# Patient Record
Sex: FEMALE | Race: WHITE | NOT HISPANIC OR LATINO | Employment: FULL TIME | URBAN - METROPOLITAN AREA
[De-identification: names, ages, dates, MRNs, and addresses within clinical notes are randomized per-mention and may not be internally consistent; named-entity substitution may affect disease eponyms.]

---

## 2018-01-16 ENCOUNTER — APPOINTMENT (OUTPATIENT)
Dept: LAB | Facility: CLINIC | Age: 28
End: 2018-01-16
Payer: COMMERCIAL

## 2018-01-16 ENCOUNTER — TRANSCRIBE ORDERS (OUTPATIENT)
Dept: LAB | Facility: CLINIC | Age: 28
End: 2018-01-16

## 2018-01-16 ENCOUNTER — ALLSCRIPTS OFFICE VISIT (OUTPATIENT)
Dept: OTHER | Facility: OTHER | Age: 28
End: 2018-01-16

## 2018-01-16 ENCOUNTER — GENERIC CONVERSION - ENCOUNTER (OUTPATIENT)
Dept: OTHER | Facility: OTHER | Age: 28
End: 2018-01-16

## 2018-01-16 DIAGNOSIS — E87.6 HYPOKALEMIA: ICD-10-CM

## 2018-01-16 DIAGNOSIS — R53.83 OTHER FATIGUE: ICD-10-CM

## 2018-01-16 LAB
ALBUMIN SERPL BCP-MCNC: 4.1 G/DL (ref 3.5–5)
ALP SERPL-CCNC: 74 U/L (ref 46–116)
ALT SERPL W P-5'-P-CCNC: 25 U/L (ref 12–78)
ANION GAP SERPL CALCULATED.3IONS-SCNC: 5 MMOL/L (ref 4–13)
AST SERPL W P-5'-P-CCNC: 14 U/L (ref 5–45)
BASOPHILS # BLD AUTO: 0.01 THOUSANDS/ΜL (ref 0–0.1)
BASOPHILS NFR BLD AUTO: 0 % (ref 0–1)
BILIRUB SERPL-MCNC: 0.35 MG/DL (ref 0.2–1)
BUN SERPL-MCNC: 16 MG/DL (ref 5–25)
CALCIUM SERPL-MCNC: 9.1 MG/DL (ref 8.3–10.1)
CHLORIDE SERPL-SCNC: 104 MMOL/L (ref 100–108)
CHOLEST SERPL-MCNC: 226 MG/DL (ref 50–200)
CO2 SERPL-SCNC: 28 MMOL/L (ref 21–32)
CREAT SERPL-MCNC: 0.82 MG/DL (ref 0.6–1.3)
EOSINOPHIL # BLD AUTO: 0.1 THOUSAND/ΜL (ref 0–0.61)
EOSINOPHIL NFR BLD AUTO: 1 % (ref 0–6)
ERYTHROCYTE [DISTWIDTH] IN BLOOD BY AUTOMATED COUNT: 12.5 % (ref 11.6–15.1)
GFR SERPL CREATININE-BSD FRML MDRD: 100 ML/MIN/1.73SQ M
GLUCOSE P FAST SERPL-MCNC: 78 MG/DL (ref 65–99)
HCT VFR BLD AUTO: 42.7 % (ref 34.8–46.1)
HDLC SERPL-MCNC: 65 MG/DL (ref 40–60)
HGB BLD-MCNC: 14.4 G/DL (ref 11.5–15.4)
LDLC SERPL CALC-MCNC: 153 MG/DL (ref 0–100)
LYMPHOCYTES # BLD AUTO: 3.03 THOUSANDS/ΜL (ref 0.6–4.47)
LYMPHOCYTES NFR BLD AUTO: 37 % (ref 14–44)
MCH RBC QN AUTO: 29.4 PG (ref 26.8–34.3)
MCHC RBC AUTO-ENTMCNC: 33.7 G/DL (ref 31.4–37.4)
MCV RBC AUTO: 87 FL (ref 82–98)
MONOCYTES # BLD AUTO: 0.65 THOUSAND/ΜL (ref 0.17–1.22)
MONOCYTES NFR BLD AUTO: 8 % (ref 4–12)
NEUTROPHILS # BLD AUTO: 4.42 THOUSANDS/ΜL (ref 1.85–7.62)
NEUTS SEG NFR BLD AUTO: 54 % (ref 43–75)
NRBC BLD AUTO-RTO: 0 /100 WBCS
PLATELET # BLD AUTO: 335 THOUSANDS/UL (ref 149–390)
PMV BLD AUTO: 11.5 FL (ref 8.9–12.7)
POTASSIUM SERPL-SCNC: 3.4 MMOL/L (ref 3.5–5.3)
PROT SERPL-MCNC: 8.2 G/DL (ref 6.4–8.2)
RBC # BLD AUTO: 4.89 MILLION/UL (ref 3.81–5.12)
SODIUM SERPL-SCNC: 137 MMOL/L (ref 136–145)
TRIGL SERPL-MCNC: 41 MG/DL
TSH SERPL DL<=0.05 MIU/L-ACNC: 2.47 UIU/ML (ref 0.36–3.74)
WBC # BLD AUTO: 8.22 THOUSAND/UL (ref 4.31–10.16)

## 2018-01-16 PROCEDURE — 80053 COMPREHEN METABOLIC PANEL: CPT

## 2018-01-16 PROCEDURE — 85025 COMPLETE CBC W/AUTO DIFF WBC: CPT

## 2018-01-16 PROCEDURE — 36415 COLL VENOUS BLD VENIPUNCTURE: CPT

## 2018-01-16 PROCEDURE — 80061 LIPID PANEL: CPT

## 2018-01-16 PROCEDURE — 84443 ASSAY THYROID STIM HORMONE: CPT

## 2018-01-17 NOTE — PROGRESS NOTES
Assessment   1  Fatigue (780 79) (R53 83)   2  Epistaxis (784 7) (R04 0)   3  Pressure in head (784 0) (R51)   4  Impacted cerumen of left ear (380 4) (H61 22)   5  Elevated blood pressure reading (796 2) (R03 0)   6  Never a smoker    Plan   Fatigue    · (1) CBC/PLT/DIFF; Status:Active; Requested [id number]:[id number];    · (1) COMPREHENSIVE METABOLIC PANEL; Status:Active; Requested [id number]:[id number];    · (1) LIPID PANEL FASTING W DIRECT LDL REFLEX; Status:Active; Requested    BHE:[id number];    · (1) TSH WITH FT4 REFLEX; Status:Active; Requested RAC:[id number]; Impacted cerumen of left ear    · Removal Impacted Cerumen Requiring Instrumentation one or both ears - POC;    Status:Complete;   Done: 24COH5002    Discussion/Summary      1  Fatigue -possibly related to her night shift work schedule - the patient was sent for labs above to assess for other potential causes  Pressure in the head -we reviewed that this appears to be most likely residual pressure from the significant spasm that she had in the trapezius about 2 weeks ago  She was encouraged to do some heating pad treatments to her neck area as well as stretches of the neck throughout the day as well as following any heating pad use  She was encouraged to call for symptoms worsen or fail to improve  Elevated blood pressure -we reviewed that her blood pressure is borderline elevated today  She states that she just had a work physical a few days ago and was told that it was normal  We will reassess when she returns for her next visit  Epistaxis -I reassured the patient that it sounds as though she just had an episode of epistaxis that was likely more on the posterior nasopharynx rather than the anterior nasopharynx  We reviewed that on exam I do not see any active bleeding  She was encouraged to try a saline nasal spray to help add some moisture to her nose since the air is drier during this time of year with the use of heat      Cerumen impaction of the left ear -removed successfully with a plastic loop  Possible side effects of new medications were reviewed with the patient/guardian today  The treatment plan was reviewed with the patient/guardian  The patient/guardian understands and agrees with the treatment plan      Chief Complaint   c/o one episode nose bleeding and head pressure for the past 2 weeks  History of Present Illness   HPI: The patient presents today for a few concerns  She notes that she was at work last night and tasted blood in her mouth  She was able to spit it out but later had a small amount the dripped from her nose  She showered when she got home and had more that she could spit it out  She has not had anymore since then  She does not have cold symptoms but remembers her nose feeling open like air blowing through it excessively when she breathed  She notes that this was in the right nare which is also the side that was bleeding  patient also notes that for 2 weeks she has pressure in the right back of the head - 2 weeks ago woke up with severe neck spasm - sleeps with pile of pillows so the number she actually sleeps on varies from night to night - denies any photophobia/phonophobia/tingling/numbness   also notes that she has been tired a lot - 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 but thinks she has always been tired -notes that she does sleep most of the day though prior to going to work (usually for about 7 hours, is awake for a few hours, then gets another 3 hours of sleep before work)      Review of Systems        Constitutional: no fever-- and-- no chills  ENT: nosebleeds, but-- as noted in HPI,-- no sore throat-- and-- no nasal discharge  Respiratory: no shortness of breath,-- no cough-- and-- no wheezing  Gastrointestinal: no nausea-- and-- no vomiting  Neurological: headache, but-- as noted in HPI,-- no numbness,-- no tingling-- and-- no dizziness  Active Problems   1  Nephrolithiasis (592 0) (N20 0)    Past Medical History   1  History of acute pharyngitis (V12 69) (Z87 09)   2  History of dermatitis (V13 3) (Z87 2)   3  History of upper respiratory infection (V12 09) (Z87 09)  Active Problems And Past Medical History Reviewed: The active problems and past medical history were reviewed and updated today  Family History   Maternal Grandmother    1  Family history of colon cancer (V16 0) (Z80 0)   2  Family history of hypertension (V17 49) (Z82 49)  Paternal Grandmother    3  Family history of Alzheimer's disease (V17 2) (Z82 0)  Maternal Grandfather    4  Family history of hypertension (V17 49) (Z82 49)    Social History    · Caffeine use (V49 89) (F15 90)   · Never a smoker   · No illicit drug use   · Occasional alcohol use    Current Meds    1  Junel Fe 24 1-20 MG-MCG(24) Oral Tablet; Therapy: (Recorded:53Vue1062) to Recorded     The medication list was reviewed and updated today  Allergies   1  No Known Drug Allergies    Vitals    Recorded: 02YRH4213 09:15AM Recorded: 36XBX0139 08:52AM   Temperature  98 8 F   Heart Rate  88   Systolic 819 131   Diastolic 90 90   Height  5 ft 4 5 in   Weight  163 lb    BMI Calculated  27 55   BSA Calculated  1 8     Physical Exam        Constitutional      General appearance: No acute distress, well appearing and well nourished  Head and Face      Head and face: Normal        Eyes      Conjunctiva and lids: No swelling, erythema or discharge  Pupils and irises: Equal, round, reactive to light  Ears, Nose, Mouth, and Throat      External inspection of ears and nose: Normal        Otoscopic examination: Abnormal  -- cerumen impaction n the left ear  Hearing: Normal        Nasal mucosa, septum, and turbinates: Normal without edema or erythema  -- no erythema, swelling, bleeding noted  Lips, teeth, and gums: Normal, good dentition         Oropharynx: Normal with no erythema, edema, exudate or lesions  Neck      Neck: Supple, symmetric, trachea midline, no masses  Thyroid: Normal, no thyromegaly  Pulmonary      Respiratory effort: No increased work of breathing or signs of respiratory distress  Auscultation of lungs: Clear to auscultation  Cardiovascular      Auscultation of heart: Normal rate and rhythm, normal S1 and S2, no murmurs  Peripheral vascular exam: Normal   Radial: right 2+-- and-- left 2+  Lymphatic      Palpation of lymph nodes in neck: Abnormal  -- mild enlargement in the tonsillar region  Musculoskeletal      Gait and station: Normal        Muscle strength/tone: Normal   Motor Strength Findings: normal upper extremity strength-- and-- normal lower extremity strength  Neurologic      Sensation: No sensory loss  Sensory exam: intact to light touch  Psychiatric      Mood and affect: Normal        Results/Data   PHQ-2 Adult Depression Screening 45UPK7182 08:54AM User, MCH+s      Test Name Result Flag Reference   PHQ-2 Adult Depression Score 0     Over the last two weeks, how often have you been bothered by any of the following problems? Little interest or pleasure in doing things: Not at all - 0     Feeling down, depressed, or hopeless: Not at all - 0   PHQ-2 Adult Depression Screening Negative          Procedure                    Procedure: cerumen removal       Indication: cerumen impaction in the left ear  Procedure Note: The procedure was performed by the Provider  A otoscope was placed in the ear canal(s) to visualize the ear canal debris  The ear was cleaned by using plastic loop  The procedure was successful  Post-Procedure:      Patient Status: the patient tolerated the procedure well  Complications: there were no complications  Follow-up as needed                 Signatures    Electronically signed by : Vj Barba [facility]; Jan 16 2018 12:43PM EST                       (Author) Electronically signed by : ZOE Amador ; Jan 16 2018  7:14PM EST

## 2018-01-23 VITALS
BODY MASS INDEX: 27.16 KG/M2 | TEMPERATURE: 98.8 F | WEIGHT: 163 LBS | SYSTOLIC BLOOD PRESSURE: 140 MMHG | HEIGHT: 65 IN | DIASTOLIC BLOOD PRESSURE: 90 MMHG | HEART RATE: 88 BPM

## 2018-01-23 NOTE — RESULT NOTES
Verified Results  (1) CBC/PLT/DIFF [id number] 09:43AM Deirdre Taylor Order Number: [id number]     Test Name Result Flag Reference   WBC COUNT 8 22 Thousand/uL  4 31-10 16   RBC COUNT 4 89 Million/uL  3 81-5 12   HEMOGLOBIN 14 4 g/dL  11 5-15 4   HEMATOCRIT 42 7 %  34 8-46  1   MCV 87 fL  82-98   MCH 29 4 pg  26 8-34 3   MCHC 33 7 g/dL  31 4-37 4   RDW 12 5 %  11 6-15 1   MPV 11 5 fL  8 9-12 7   PLATELET COUNT 232 Thousands/uL  149-390   nRBC AUTOMATED 0 /100 WBCs     NEUTROPHILS RELATIVE PERCENT 54 %  43-75   LYMPHOCYTES RELATIVE PERCENT 37 %  14-44   MONOCYTES RELATIVE PERCENT 8 %  4-12   EOSINOPHILS RELATIVE PERCENT 1 %  0-6   BASOPHILS RELATIVE PERCENT 0 %  0-1   NEUTROPHILS ABSOLUTE COUNT 4 42 Thousands/? ??L  1 85-7 62   LYMPHOCYTES ABSOLUTE COUNT 3 03 Thousands/? ??L  0 60-4 47   MONOCYTES ABSOLUTE COUNT 0 65 Thousand/? ??L  0 17-1 22   EOSINOPHILS ABSOLUTE COUNT 0 10 Thousand/? ??L  0 00-0 61   BASOPHILS ABSOLUTE COUNT 0 01 Thousands/? ??L  0 00-0 10     (1) COMPREHENSIVE METABOLIC PANEL [id number] 93:79LD Ken Bustamante    Order Number: [id number]     Test Name Result Flag Reference   SODIUM 137 mmol/L  136-145   POTASSIUM 3 4 mmol/L L 3 5-5 3   CHLORIDE 104 mmol/L  100-108   CARBON DIOXIDE 28 mmol/L  21-32   ANION GAP (CALC) 5 mmol/L  4-13   BLOOD UREA NITROGEN 16 mg/dL  5-25   CREATININE 0 82 mg/dL  0 60-1 30   Standardized to IDMS reference method   CALCIUM 9 1 mg/dL  8 3-10 1   BILI, TOTAL 0 35 mg/dL  0 20-1 00   ALK PHOSPHATAS 74 U/L     ALT (SGPT) 25 U/L  12-78   Specimen collection should occur prior to Sulfasalazine and/or Sulfapyridine administration due to the potential for falsely depressed results  AST(SGOT) 14 U/L  5-45   Specimen collection should occur prior to Sulfasalazine administration due to the potential for falsely depressed results     ALBUMIN 4 1 g/dL  3 5-5 0   TOTAL PROTEIN 8 2 g/dL  6 4-8 2   eGFR 100 ml/min/1 73sq m     National Kidney Disease Education Program recommendations are as follows:  GFR calculation is accurate only with a steady state creatinine  Chronic Kidney disease less than 60 ml/min/1 73 sq  meters  Kidney failure less than 15 ml/min/1 73 sq  meters  GLUCOSE FASTING 78 mg/dL  65-99   Specimen collection should occur prior to Sulfasalazine administration due to the potential for falsely depressed results  Specimen collection should occur prior to Sulfapyridine administration due to the potential for falsely elevated results  (1) LIPID PANEL FASTING W DIRECT LDL REFLEX [id number] 09:43AM Islamorada Cera   TW Order Number: [id number]     Test Name Result Flag Reference   CHOLESTEROL 226 mg/dL H    Cholesterol:    Desirable <200 mg/dl    Borderline 200-239 mg/dl    High>239   LDL CHOLESTEROL CALCULATED 153 mg/dL H 0-100   This screening LDL is a calculated result  It does not have the accuracy of the Direct Measured LDL in the monitoring of patients with hyperlipidemia and/or statin therapy  Direct Measure LDL (RHZ375) must be ordered separately in these patients  Triglyceride:        Normal <150 mg/dl   Borderline High 150-199 mg/dl   High 200-499 mg/dl   Very High >499 mg/dl   TRIGLYCERIDES 41 mg/dL  <=150   Specimen collection should occur prior to N-Acetylcysteine or Metamizole administration due to the potential for falsely depressed results  HDL,DIRECT 65 mg/dL H 40-60   HDL Cholesterol:    High>59 mg/dL    Low <41 mg/dL  HDL Cholesterol:    High>59 mg/dL    Low <41 mg/dL     (1) TSH WITH FT4 REFLEX [id number] 09:43AM [place]orada Cera   TW Order Number: [id number]     Test Name Result Flag Reference   TSH 2 470 uIU/mL  0 358-3 740   Patients undergoing fluorescein dye angiography may retain small amounts of fluorescein in the body for 48-72 hours post procedure  Samples containing fluorescein can produce falsely depressed TSH values   If the patient had this procedure,a specimen should be resubmitted post fluorescein clearance            The recommended reference ranges for TSH during pregnancy are as follows:  First trimester 0 1 to 2 5 uIU/mL  Second trimester  0 2 to 3 0 uIU/mL  Third trimester 0 3 to 3 0 uIU/m

## 2018-01-31 ENCOUNTER — APPOINTMENT (OUTPATIENT)
Dept: LAB | Facility: MEDICAL CENTER | Age: 28
End: 2018-01-31
Payer: COMMERCIAL

## 2018-01-31 ENCOUNTER — TRANSCRIBE ORDERS (OUTPATIENT)
Dept: ADMINISTRATIVE | Facility: HOSPITAL | Age: 28
End: 2018-01-31

## 2018-01-31 DIAGNOSIS — E87.6 HYPOKALEMIA: ICD-10-CM

## 2018-01-31 LAB
ANION GAP SERPL CALCULATED.3IONS-SCNC: 5 MMOL/L (ref 4–13)
BUN SERPL-MCNC: 13 MG/DL (ref 5–25)
CALCIUM SERPL-MCNC: 9.1 MG/DL (ref 8.3–10.1)
CHLORIDE SERPL-SCNC: 104 MMOL/L (ref 100–108)
CO2 SERPL-SCNC: 29 MMOL/L (ref 21–32)
CREAT SERPL-MCNC: 0.8 MG/DL (ref 0.6–1.3)
GFR SERPL CREATININE-BSD FRML MDRD: 103 ML/MIN/1.73SQ M
GLUCOSE SERPL-MCNC: 84 MG/DL (ref 65–140)
POTASSIUM SERPL-SCNC: 4.1 MMOL/L (ref 3.5–5.3)
SODIUM SERPL-SCNC: 138 MMOL/L (ref 136–145)

## 2018-01-31 PROCEDURE — 36415 COLL VENOUS BLD VENIPUNCTURE: CPT

## 2018-01-31 PROCEDURE — 80048 BASIC METABOLIC PNL TOTAL CA: CPT

## 2018-07-17 DIAGNOSIS — E78.00 PURE HYPERCHOLESTEROLEMIA: ICD-10-CM

## 2021-05-07 ENCOUNTER — IMMUNIZATIONS (OUTPATIENT)
Dept: FAMILY MEDICINE CLINIC | Facility: HOSPITAL | Age: 31
End: 2021-05-07

## 2021-05-07 DIAGNOSIS — Z23 ENCOUNTER FOR IMMUNIZATION: Primary | ICD-10-CM

## 2021-05-07 PROCEDURE — 91301 SARS-COV-2 / COVID-19 MRNA VACCINE (MODERNA) 100 MCG: CPT

## 2021-05-07 PROCEDURE — 0011A SARS-COV-2 / COVID-19 MRNA VACCINE (MODERNA) 100 MCG: CPT

## 2021-06-02 ENCOUNTER — IMMUNIZATIONS (OUTPATIENT)
Dept: FAMILY MEDICINE CLINIC | Facility: HOSPITAL | Age: 31
End: 2021-06-02

## 2021-06-02 DIAGNOSIS — Z23 ENCOUNTER FOR IMMUNIZATION: Primary | ICD-10-CM

## 2021-06-02 PROCEDURE — 0012A SARS-COV-2 / COVID-19 MRNA VACCINE (MODERNA) 100 MCG: CPT

## 2021-06-02 PROCEDURE — 91301 SARS-COV-2 / COVID-19 MRNA VACCINE (MODERNA) 100 MCG: CPT

## 2021-08-05 ENCOUNTER — OFFICE VISIT (OUTPATIENT)
Dept: FAMILY MEDICINE CLINIC | Facility: CLINIC | Age: 31
End: 2021-08-05
Payer: COMMERCIAL

## 2021-08-05 VITALS
HEIGHT: 65 IN | TEMPERATURE: 98 F | DIASTOLIC BLOOD PRESSURE: 77 MMHG | SYSTOLIC BLOOD PRESSURE: 110 MMHG | OXYGEN SATURATION: 99 % | HEART RATE: 97 BPM | BODY MASS INDEX: 27.86 KG/M2 | WEIGHT: 167.2 LBS

## 2021-08-05 DIAGNOSIS — E78.2 MIXED HYPERLIPIDEMIA: ICD-10-CM

## 2021-08-05 DIAGNOSIS — Z00.00 PHYSICAL EXAM: Primary | ICD-10-CM

## 2021-08-05 DIAGNOSIS — Z78.9 VEGETARIAN DIET: ICD-10-CM

## 2021-08-05 PROCEDURE — 1036F TOBACCO NON-USER: CPT | Performed by: FAMILY MEDICINE

## 2021-08-05 PROCEDURE — 3008F BODY MASS INDEX DOCD: CPT | Performed by: FAMILY MEDICINE

## 2021-08-05 PROCEDURE — 3725F SCREEN DEPRESSION PERFORMED: CPT | Performed by: FAMILY MEDICINE

## 2021-08-05 PROCEDURE — 99395 PREV VISIT EST AGE 18-39: CPT | Performed by: FAMILY MEDICINE

## 2021-08-05 RX ORDER — NORETHINDRONE ACETATE AND ETHINYL ESTRADIOL AND FERROUS FUMARATE 1MG-20(24)
KIT ORAL
COMMUNITY

## 2021-08-05 NOTE — PATIENT INSTRUCTIONS
Fasting labs  Patient to call for results if he/she does not hear from us    Suspect lesion on cheek is an inclusion cyst (epidermal inclusion cyst)    Return in 1 year

## 2021-08-05 NOTE — PROGRESS NOTES
FAMILY PRACTICE OFFICE VISIT    NAME: Rafael Velazquez    AGE: 31 y o  SEX: female  : 1990   MRN: [id number]    DATE: 2021  TIME: 1:14 PM    Assessment and Plan   1  Physical exam  Anticipatory guidance and preventative medicine discussed  Urged pt to get in for routine dental exam as she is overdue  Had pap 2020  And gyn exam 3/2021  Advise returning for eye exam - as pt wears glasses for driving and is overdue for visit  Vision decreased during visit - not wearing corrective lenses - 20/50 right eye  20/50 left eye  20/40 combined vision both eyes    Vaccines up to date  Advise flu shot in the fall  Pt has appt with podiatry tomorrow  And with derm in 2021 to discuss inclusion cyst on left cheek    Pt is a vegetarian X 1 5 years  Will check labs  Also - h/o HL  Patient Instructions   Fasting labs  Patient to call for results if he/she does not hear from us    Suspect lesion on cheek is an inclusion cyst (epidermal inclusion cyst)    Return in 1 year          Chief Complaint     Chief Complaint   Patient presents with    Physical Exam       History of Present Illness   Gary Munoz is a 31y o -year-old female who [address] [address]  UP TO [facility] [address]  Overall - feels good  Pt is considering entering the [employer]  Had started the process a couple years ago but then covid hit - paperwork  - and pt now interested in re-starting process  She is following also with podiatry - tomorrow for possible foot fungus  Pt would like to lose weight  She thinks that with work hour changes - she will be more active  And also hopes to return to the gym  Review of Systems   Review of Systems   Constitutional: Negative for activity change, appetite change, chills, fatigue, fever and unexpected weight change  No regular exercise - 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  Would like to lose some weight       HENT: Negative for congestion, ear pain, postnasal drip, sinus pressure, sinus pain, sore throat, trouble swallowing and voice change  Eyes: Negative for photophobia, pain and visual disturbance  Respiratory: Negative for apnea, cough, shortness of breath and wheezing  Cardiovascular: Negative for chest pain, palpitations and leg swelling  Gastrointestinal: Negative for abdominal pain, blood in stool, constipation, diarrhea, nausea and vomiting  Denies GERD   Endocrine: Negative for polydipsia, polyphagia and polyuria  Genitourinary: Negative for decreased urine volume, dyspareunia, dysuria, flank pain, frequency, hematuria, menstrual problem, pelvic pain, urgency and vaginal bleeding  Menses regular on ocp - denies heavy bleeding  No h/o uti's       Musculoskeletal: Negative for arthralgias, back pain, gait problem, myalgias and neck stiffness  Skin: Negative for rash  Allergic/Immunologic: Positive for environmental allergies  Negative for food allergies  Seasonal allergies - takes meds prn only (otc)   Neurological: Negative for dizziness, tremors, seizures, syncope, weakness and headaches  Hematological: Negative for adenopathy  Does not bruise/bleed easily  Psychiatric/Behavioral: Negative for dysphoric mood, self-injury, sleep disturbance and suicidal ideas  The patient is not nervous/anxious  Denies depression/anxiety       Active Problem List   There is no problem list on file for this patient  Past Medical History:  Past Medical History:   Diagnosis Date    Kidney stones        Past Surgical History:  History reviewed  No pertinent surgical history      Family History:  Family History   Problem Relation Age of Onset    No Known Problems Mother     Arthritis Father     Skin cancer Father     Colon cancer Maternal Grandmother     Arthritis Maternal Grandfather     Diabetes Maternal Grandfather     Dementia Paternal Grandmother        Social History:  Social History Socioeconomic History    Marital status: Single     Spouse name: Not on file    Number of children: Not on file    Years of education: Not on file    Highest education level: Not on file   Occupational History    Not on file   Tobacco Use    Smoking status: Never Smoker    Smokeless tobacco: Never Used   Substance and Sexual Activity    Alcohol use: Never    Drug use: Never    Sexual activity: Not on file   Other Topics Concern    Not on file   Social History Narrative    Not on file     Social Determinants of Health     Financial Resource Strain:     Difficulty of Paying Living Expenses:    Food Insecurity:     Worried About Running Out of Food in the Last Year:     [address] in the Last Year:    Transportation Needs:     Lack of Transportation (Medical):  Lack of Transportation (Non-Medical):    Physical Activity:     Days of Exercise per Week:     Minutes of Exercise per Session:    Stress:     Feeling of Stress :    Social Connections:     Frequency of Communication with Friends and Family:     Frequency of Social Gatherings with Friends and Family:     Attends [employer] Services:     Active Member of Clubs or Organizations:     Attends Club or Organization Meetings:     Marital Status:    Intimate Partner Violence:     Fear of Current or Ex-Partner:     Emotionally Abused:     Physically Abused:     Sexually Abused:        Objective     Vitals:    08/05/21 1303   BP: 110/77   Pulse: 97   Temp: 98 °F (36 7 °C)   SpO2: 99%     Wt Readings from Last 3 Encounters:   08/05/21 75 8 kg (167 lb 3 2 oz)   01/16/18 73 9 kg (163 lb)   12/20/13 71 7 kg (158 lb)       Physical Exam  Vitals and nursing note reviewed  Constitutional:       General: She is not in acute distress  Appearance: Normal appearance  She is not ill-appearing or toxic-appearing  HENT:      Right Ear: Tympanic membrane normal  There is no impacted cerumen        Left Ear: Tympanic membrane normal  There is no impacted cerumen  Mouth/Throat:      Pharynx: No oropharyngeal exudate  Eyes:      General: No scleral icterus  Extraocular Movements: Extraocular movements intact  Pupils: Pupils are equal, round, and reactive to light  Cardiovascular:      Rate and Rhythm: Normal rate and regular rhythm  Pulses: Normal pulses  Heart sounds: Normal heart sounds  No murmur heard  Pulmonary:      Effort: Pulmonary effort is normal  No respiratory distress  Breath sounds: Normal breath sounds  No stridor  No wheezing, rhonchi or rales  Abdominal:      General: Abdomen is flat  There is no distension  Palpations: Abdomen is soft  There is no mass  Tenderness: There is no abdominal tenderness  There is no guarding  Musculoskeletal:      Cervical back: No rigidity or tenderness  Right lower leg: No edema  Left lower leg: No edema  Lymphadenopathy:      Cervical: No cervical adenopathy  Skin:     General: Skin is warm  Coloration: Skin is not jaundiced  Comments: Cystic lesion - left cheek  Pt has tried 'popping' - and some whitish material has come out  Present X couple years     Neurological:      General: No focal deficit present  Mental Status: She is alert and oriented to person, place, and time  Psychiatric:         Mood and Affect: Mood normal          Behavior: Behavior normal          Thought Content:  Thought content normal          Judgment: Judgment normal          Pertinent Laboratory/Diagnostic Studies:  Lab Results   Component Value Date    BUN 13 01/31/2018    CREATININE 0 80 01/31/2018    CALCIUM 9 1 01/31/2018    K 4 1 01/31/2018    CO2 29 01/31/2018     01/31/2018     Lab Results   Component Value Date    ALT 25 01/16/2018    AST 14 01/16/2018    ALKPHOS 74 01/16/2018       Lab Results   Component Value Date    WBC 8 22 01/16/2018    HGB 14 4 01/16/2018    HCT 42 7 01/16/2018    MCV 87 01/16/2018     01/16/2018       No results found for: TSH    No results found for: CHOL  Lab Results   Component Value Date    TRIG 41 01/16/2018     Lab Results   Component Value Date    HDL 65 (H) 01/16/2018     Lab Results   Component Value Date    LDLCALC 153 (H) 01/16/2018     No results found for: HGBA1C    Results for orders placed or performed in visit on 12/71/83   Basic metabolic panel   Result Value Ref Range    Sodium 138 136 - 145 mmol/L    Potassium 4 1 3 5 - 5 3 mmol/L    Chloride 104 100 - 108 mmol/L    CO2 29 21 - 32 mmol/L    ANION GAP 5 4 - 13 mmol/L    BUN 13 5 - 25 mg/dL    Creatinine 0 80 0 60 - 1 30 mg/dL    Glucose 84 65 - 140 mg/dL    Calcium 9 1 8 3 - 10 1 mg/dL    eGFR 103 ml/min/1 73sq m       No orders of the defined types were placed in this encounter  ALLERGIES:  No Known Allergies    Current Medications     Current Outpatient Medications   Medication Sig Dispense Refill    norethindrone-ethinyl estradiol-ferrous fumarate (Junel Fe 24) 1-20 MG-MCG(24) per tablet Take by mouth       No current facility-administered medications for this visit           Health Maintenance     Health Maintenance   Topic Date Due    Hepatitis C Screening  Never done    HIV Screening  Never done    BMI: Followup Plan  Never done    Annual Physical  Never done    Cervical Cancer Screening  Never done    Influenza Vaccine (1) 09/01/2021    Depression Screening PHQ  08/05/2022    BMI: Adult  08/05/2022    DTaP,Tdap,and Td Vaccines (8 - Td or Tdap) 11/18/2029    HIB Vaccine  Completed    Hepatitis B Vaccine  Completed    IPV Vaccine  Completed    HPV Vaccine  Completed    COVID-19 Vaccine  Completed    Pneumococcal Vaccine: Pediatrics (0 to 5 Years) and At-Risk Patients (6 to 59 Years)  Aged Out    Hepatitis A Vaccine  Aged Out    Meningococcal ACWY Vaccine  Aged [employer] History   Administered Date(s) Administered    DTaP 5 1992, 01/28/1993, 05/01/1993, 03/22/1994, 08/21/1998    HPV Quadrivalent 10/19/2008, 03/03/2010, 07/02/2010    Hep B, adult 1990, 1990, 05/01/1993    Hib (PRP-OMP) 1992, 01/28/1993, 02/04/1994    IPV 1990, 1990, 01/27/1993, 03/22/1993, 08/21/1994    MMR 02/04/1994, 08/22/1997    Meningococcal, Unknown Serogroups 08/09/2007    SARS-CoV-2 / COVID-19 mRNA IM (Adela Guanako) 05/07/2021, 06/02/2021    Td (adult), adsorbed 02/15/2005    Tdap 11/19/2008, 11/18/2019    Varicella 08/21/1998, 11/19/2008          Hussein Lopez, DO

## 2021-08-06 ENCOUNTER — TELEPHONE (OUTPATIENT)
Dept: ADMINISTRATIVE | Facility: OTHER | Age: 31
End: 2021-08-06

## 2021-08-06 NOTE — TELEPHONE ENCOUNTER
----- Message from [facility] sent at 8/5/2021  1:12 PM EDT -----  08/05/21 1:12 PM    Hello, our patient Gary Munoz has had Pap Smear (HPV) aka Cervical Cancer Screening completed/performed   Please assist in updating the patient chart by making an External outreach to [address] [place] [address] and Gynecology - [address] located in [phone] The date of service is 01/08/2020    Thank you,  [address] [facility]

## 2021-08-06 NOTE — TELEPHONE ENCOUNTER
Upon review of the In Basket request we were able to locate, review, and update the patient chart as requested for Pap Smear (HPV) aka Cervical Cancer Screening  Any additional questions or concerns should be emailed to the Practice Liaisons via [email or web address] email, please do not reply via In Basket      Thank you  Елена Coy

## 2021-09-13 ENCOUNTER — LAB (OUTPATIENT)
Dept: LAB | Age: 31
End: 2021-09-13
Payer: COMMERCIAL

## 2021-09-13 DIAGNOSIS — E78.2 MIXED HYPERLIPIDEMIA: ICD-10-CM

## 2021-09-13 DIAGNOSIS — Z78.9 VEGETARIAN DIET: ICD-10-CM

## 2021-09-13 LAB
ALBUMIN SERPL BCP-MCNC: 4.1 G/DL (ref 3.5–5)
ALP SERPL-CCNC: 85 U/L (ref 46–116)
ALT SERPL W P-5'-P-CCNC: 17 U/L (ref 12–78)
ANION GAP SERPL CALCULATED.3IONS-SCNC: 5 MMOL/L (ref 4–13)
AST SERPL W P-5'-P-CCNC: 11 U/L (ref 5–45)
BASOPHILS # BLD AUTO: 0.04 THOUSANDS/ΜL (ref 0–0.1)
BASOPHILS NFR BLD AUTO: 1 % (ref 0–1)
BILIRUB SERPL-MCNC: 0.49 MG/DL (ref 0.2–1)
BUN SERPL-MCNC: 11 MG/DL (ref 5–25)
CALCIUM SERPL-MCNC: 9 MG/DL (ref 8.3–10.1)
CHLORIDE SERPL-SCNC: 106 MMOL/L (ref 100–108)
CHOLEST SERPL-MCNC: 239 MG/DL (ref 50–200)
CO2 SERPL-SCNC: 25 MMOL/L (ref 21–32)
CREAT SERPL-MCNC: 0.84 MG/DL (ref 0.6–1.3)
EOSINOPHIL # BLD AUTO: 0.05 THOUSAND/ΜL (ref 0–0.61)
EOSINOPHIL NFR BLD AUTO: 1 % (ref 0–6)
ERYTHROCYTE [DISTWIDTH] IN BLOOD BY AUTOMATED COUNT: 12.3 % (ref 11.6–15.1)
GFR SERPL CREATININE-BSD FRML MDRD: 95 ML/MIN/1.73SQ M
GLUCOSE P FAST SERPL-MCNC: 79 MG/DL (ref 65–99)
HCT VFR BLD AUTO: 42.7 % (ref 34.8–46.1)
HDLC SERPL-MCNC: 62 MG/DL
HGB BLD-MCNC: 14.1 G/DL (ref 11.5–15.4)
IMM GRANULOCYTES # BLD AUTO: 0.02 THOUSAND/UL (ref 0–0.2)
IMM GRANULOCYTES NFR BLD AUTO: 0 % (ref 0–2)
LDLC SERPL CALC-MCNC: 165 MG/DL (ref 0–100)
LYMPHOCYTES # BLD AUTO: 2.16 THOUSANDS/ΜL (ref 0.6–4.47)
LYMPHOCYTES NFR BLD AUTO: 25 % (ref 14–44)
MCH RBC QN AUTO: 29.3 PG (ref 26.8–34.3)
MCHC RBC AUTO-ENTMCNC: 33 G/DL (ref 31.4–37.4)
MCV RBC AUTO: 89 FL (ref 82–98)
MONOCYTES # BLD AUTO: 0.5 THOUSAND/ΜL (ref 0.17–1.22)
MONOCYTES NFR BLD AUTO: 6 % (ref 4–12)
NEUTROPHILS # BLD AUTO: 5.93 THOUSANDS/ΜL (ref 1.85–7.62)
NEUTS SEG NFR BLD AUTO: 67 % (ref 43–75)
NONHDLC SERPL-MCNC: 177 MG/DL
NRBC BLD AUTO-RTO: 0 /100 WBCS
PLATELET # BLD AUTO: 319 THOUSANDS/UL (ref 149–390)
PMV BLD AUTO: 11.2 FL (ref 8.9–12.7)
POTASSIUM SERPL-SCNC: 3.6 MMOL/L (ref 3.5–5.3)
PROT SERPL-MCNC: 8.7 G/DL (ref 6.4–8.2)
RBC # BLD AUTO: 4.82 MILLION/UL (ref 3.81–5.12)
SODIUM SERPL-SCNC: 136 MMOL/L (ref 136–145)
TRIGL SERPL-MCNC: 61 MG/DL
TSH SERPL DL<=0.05 MIU/L-ACNC: 0.65 UIU/ML (ref 0.36–3.74)
VIT B12 SERPL-MCNC: 927 PG/ML (ref 100–900)
WBC # BLD AUTO: 8.7 THOUSAND/UL (ref 4.31–10.16)

## 2021-09-13 PROCEDURE — 80061 LIPID PANEL: CPT

## 2021-09-13 PROCEDURE — 85025 COMPLETE CBC W/AUTO DIFF WBC: CPT

## 2021-09-13 PROCEDURE — 80053 COMPREHEN METABOLIC PANEL: CPT

## 2021-09-13 PROCEDURE — 82607 VITAMIN B-12: CPT

## 2021-09-13 PROCEDURE — 84443 ASSAY THYROID STIM HORMONE: CPT

## 2021-09-13 PROCEDURE — 36415 COLL VENOUS BLD VENIPUNCTURE: CPT

## 2021-09-22 ENCOUNTER — OFFICE VISIT (OUTPATIENT)
Dept: FAMILY MEDICINE CLINIC | Facility: CLINIC | Age: 31
End: 2021-09-22
Payer: COMMERCIAL

## 2021-09-22 VITALS
HEIGHT: 65 IN | BODY MASS INDEX: 26.99 KG/M2 | DIASTOLIC BLOOD PRESSURE: 84 MMHG | SYSTOLIC BLOOD PRESSURE: 128 MMHG | WEIGHT: 162 LBS | OXYGEN SATURATION: 98 % | TEMPERATURE: 97.8 F | HEART RATE: 81 BPM

## 2021-09-22 DIAGNOSIS — E88.09 HYPERPROTEINEMIA: ICD-10-CM

## 2021-09-22 DIAGNOSIS — E78.2 MIXED HYPERLIPIDEMIA: Primary | ICD-10-CM

## 2021-09-22 PROCEDURE — 1036F TOBACCO NON-USER: CPT | Performed by: FAMILY MEDICINE

## 2021-09-22 PROCEDURE — 3008F BODY MASS INDEX DOCD: CPT | Performed by: FAMILY MEDICINE

## 2021-09-22 PROCEDURE — 99214 OFFICE O/P EST MOD 30 MIN: CPT | Performed by: FAMILY MEDICINE

## 2021-09-22 NOTE — PATIENT INSTRUCTIONS
RECOMMENDATION FOR WHOLE FOODS - IE: NOTHING PROCESSED  CHICKEN, TURKEY , FISH AS MAIN PROTEIN SOURCES  PLANT BASED DIET IS GOOD   CUT BACK ON EGGS  CHEESE IN MODERATION ALONG WITH OTHER DAIRY SOURCES  NUTS - IN MODERATION  INCREASE FIBER - AIM FOR AT LEAST 3 GM FIBER /SERVING - IE: WHOLE GRAINS PASTA OR RICE; BROWN RICE, OATMEAL, WHOLE GRAIN CEREALS - IE: KELLOGS ALL BRAN, Gearold Millin,        EXERCISE 3-5X/WEEK - FOR 30 MIN AT A TIME     YOU CAN CONSIDER STARTING ON FISH OIL 1000 MG CAPSULES - TAKE 1 DAILY FOR A MONTH AND THEN INCREASE TO 2 A DAY    REPEAT LABS IN 6 MOS - FASTING

## 2021-09-22 NOTE — PROGRESS NOTES
FAMILY PRACTICE OFFICE VISIT    NAME: Rafael Velazquez    AGE: 31 y o  SEX: female  : 1990   MRN: [id number]    DATE: 2021  TIME: 5:02 PM    Assessment and Plan   1  Mixed hyperlipidemia  ELEVATED RECENTLY AND IN 2018  DISCUSSED AT LENGTH DIETARY AND LIFESTYLE CHANGES - SEE BELOW  PT PREFERS TO HOLD OFF ON MEDICATION FOR NOW  2  Hyperproteinemia  INCIDENTALLY FOUND ON LABS  REPEAT ORDERED  Patient to call for results if he/she does not hear from us      RETURN IN 6 MOS AFTER LABS    Note - spent a fair amount of time discussing pt's recent breakup with ronak  She is very upset but is appropriate  She is considering counseling and also speaking with friends for additional support    Patient Instructions   RECOMMENDATION FOR WHOLE FOODS - IE: NOTHING PROCESSED  CHICKEN, TURKEY , FISH AS MAIN PROTEIN SOURCES  PLANT BASED DIET IS GOOD   CUT BACK ON EGGS  CHEESE IN MODERATION ALONG WITH OTHER DAIRY SOURCES  NUTS - IN MODERATION  INCREASE FIBER - AIM FOR AT LEAST 3 GM FIBER /SERVING - IE: WHOLE GRAINS PASTA OR RICE; BROWN RICE, OATMEAL, WHOLE GRAIN CEREALS - IE: CHAIM ALL BRAN, Juanita Siegel,    EXERCISE 3-5X/WEEK - FOR 30 MIN AT A TIME     YOU CAN CONSIDER STARTING ON FISH OIL 1000 MG CAPSULES - TAKE 1 DAILY FOR A MONTH AND THEN INCREASE TO 2 A DAY    REPEAT LABS IN 6 MOS - FASTING            Chief Complaint     Chief Complaint   Patient presents with    Results       History of Present Illness   Rafael Velazquez is a 31y o -year-old female who PRESENTS TODAY FOR DISCUSSION OF BLOODWORK DONE RECENTLY    OF NOTE - PT PRESENTED FOR A WORKMAN'S COMP VISIT YESTERDAY AND IS USING STEROID CREAM ON ARM    NO KNOWN FAMILY H/O HL      Review of Systems   Review of Systems   Constitutional: Negative for fever  Respiratory: Negative for cough, shortness of breath and wheezing  Cardiovascular: Negative for chest pain and palpitations  Psychiatric/Behavioral: Positive for dysphoric mood   Negative for self-injury and suicidal ideas  RECENT BREAK UP WITH ARMANIAIDE  HER BOYFRIEND BROKE UP WITH  HER x 2 WEEKS AGO - UNEXPECTED  Active Problem List   There is no problem list on file for this patient  Past Medical History:  Past Medical History:   Diagnosis Date    Kidney stones        Past Surgical History:  History reviewed  No pertinent surgical history  Family History:  Family History   Problem Relation Age of Onset    No Known Problems Mother     Arthritis Father     Skin cancer Father     Colon cancer Maternal Grandmother     Arthritis Maternal Grandfather     Diabetes Maternal Grandfather     Dementia Paternal Grandmother        Social History:  Social History     Socioeconomic History    Marital status: Single     Spouse name: Not on file    Number of children: Not on file    Years of education: Not on file    Highest education level: Not on file   Occupational History    Not on file   Tobacco Use    Smoking status: Never Smoker    Smokeless tobacco: Never Used   Substance and Sexual Activity    Alcohol use: Never    Drug use: Never    Sexual activity: Not on file   Other Topics Concern    Not on file   Social History Narrative    Not on file     Social Determinants of Health     Financial Resource Strain:     Difficulty of Paying Living Expenses:    Food Insecurity:     Worried About Running Out of Food in the Last Year:     Ran Out of Food in the Last Year:    Transportation Needs:     Lack of Transportation (Medical):      Lack of Transportation (Non-Medical):    Physical Activity:     Days of Exercise per Week:     Minutes of Exercise per Session:    Stress:     Feeling of Stress :    Social Connections:     Frequency of Communication with Friends and Family:     Frequency of Social Gatherings with Friends and Family:     Attends [employer] Services:     Active Member of Clubs or Organizations:     Attends Club or Organization Meetings:     Marital Status:    Intimate Partner Violence:     Fear of Current or Ex-Partner:     Emotionally Abused:     Physically Abused:     Sexually Abused:        Objective     Vitals:    09/22/21 1647   BP: 128/84   Pulse: 81   Temp: 97 8 °F (36 6 °C)   SpO2: 98%     Wt Readings from Last 3 Encounters:   09/22/21 73 5 kg (162 lb)   08/05/21 75 8 kg (167 lb 3 2 oz)   01/16/18 73 9 kg (163 lb)       Physical Exam  Vitals and nursing note reviewed  Constitutional:       General: She is not in acute distress  Appearance: Normal appearance  She is not ill-appearing or toxic-appearing  Cardiovascular:      Rate and Rhythm: Normal rate and regular rhythm  Pulses: Normal pulses  Heart sounds: Normal heart sounds  No murmur heard  Pulmonary:      Effort: Pulmonary effort is normal  No respiratory distress  Breath sounds: Normal breath sounds  No wheezing, rhonchi or rales  Neurological:      General: No focal deficit present  Mental Status: She is alert and oriented to person, place, and time  Mental status is at baseline  Psychiatric:         Behavior: Behavior normal          Thought Content:  Thought content normal          Judgment: Judgment normal       Comments: Pt crying when discussing her relationship breakup but is very appropriate  She seemed a bit relieved after talking about things  Denies suicidal ideations           Pertinent Laboratory/Diagnostic Studies:  Lab Results   Component Value Date    BUN 11 09/13/2021    CREATININE 0 84 09/13/2021    CALCIUM 9 0 09/13/2021    K 3 6 09/13/2021    CO2 25 09/13/2021     09/13/2021     Lab Results   Component Value Date    ALT 17 09/13/2021    AST 11 09/13/2021    ALKPHOS 85 09/13/2021       Lab Results   Component Value Date    WBC 8 70 09/13/2021    HGB 14 1 09/13/2021    HCT 42 7 09/13/2021    MCV 89 09/13/2021     09/13/2021       No results found for: TSH    No results found for: CHOL  Lab Results   Component Value Date    TRIG 61 09/13/2021     Lab Results   Component Value Date    HDL 62 09/13/2021     Lab Results   Component Value Date    LDLCALC 165 (H) 09/13/2021     No results found for: HGBA1C    Results for orders placed or performed in visit on 09/13/21   CBC and differential   Result Value Ref Range    WBC 8 70 4 31 - 10 16 Thousand/uL    RBC 4 82 3 81 - 5 12 Million/uL    Hemoglobin 14 1 11 5 - 15 4 g/dL    Hematocrit 42 7 34 8 - 46 1 %    MCV 89 82 - 98 fL    MCH 29 3 26 8 - 34 3 pg    MCHC 33 0 31 4 - 37 4 g/dL    RDW 12 3 11 6 - 15 1 %    MPV 11 2 8 9 - 12 7 fL    Platelets 478 946 - 119 Thousands/uL    nRBC 0 /100 WBCs    Neutrophils Relative 67 43 - 75 %    Immat GRANS % 0 0 - 2 %    Lymphocytes Relative 25 14 - 44 %    Monocytes Relative 6 4 - 12 %    Eosinophils Relative 1 0 - 6 %    Basophils Relative 1 0 - 1 %    Neutrophils Absolute 5 93 1 85 - 7 62 Thousands/µL    Immature Grans Absolute 0 02 0 00 - 0 20 Thousand/uL    Lymphocytes Absolute 2 16 0 60 - 4 47 Thousands/µL    Monocytes Absolute 0 50 0 17 - 1 22 Thousand/µL    Eosinophils Absolute 0 05 0 00 - 0 61 Thousand/µL    Basophils Absolute 0 04 0 00 - 0 10 Thousands/µL   Comprehensive metabolic panel   Result Value Ref Range    Sodium 136 136 - 145 mmol/L    Potassium 3 6 3 5 - 5 3 mmol/L    Chloride 106 100 - 108 mmol/L    CO2 25 21 - 32 mmol/L    ANION GAP 5 4 - 13 mmol/L    BUN 11 5 - 25 mg/dL    Creatinine 0 84 0 60 - 1 30 mg/dL    Glucose, Fasting 79 65 - 99 mg/dL    Calcium 9 0 8 3 - 10 1 mg/dL    AST 11 5 - 45 U/L    ALT 17 12 - 78 U/L    Alkaline Phosphatase 85 46 - 116 U/L    Total Protein 8 7 (H) 6 4 - 8 2 g/dL    Albumin 4 1 3 5 - 5 0 g/dL    Total Bilirubin 0 49 0 20 - 1 00 mg/dL    eGFR 95 ml/min/1 73sq m   Lipid panel   Result Value Ref Range    Cholesterol 239 (H) 50 - 200 mg/dL    Triglycerides 61 <=150 mg/dL    HDL, Direct 62 >=40 mg/dL    LDL Calculated 165 (H) 0 - 100 mg/dL    Non-HDL-Chol (CHOL-HDL) 177 mg/dl   TSH, 3rd generation   Result Value Ref Range    TSH 3RD GENERATON 0 649 0 358 - 3 740 uIU/mL   Vitamin B12   Result Value Ref Range    Vitamin B-12 927 (H) 100 - 900 pg/mL       No orders of the defined types were placed in this encounter  ALLERGIES:  No Known Allergies    Current Medications     Current Outpatient Medications   Medication Sig Dispense Refill    norethindrone-ethinyl estradiol-ferrous fumarate (Junel Fe 24) 1-20 MG-MCG(24) per tablet Take by mouth       No current facility-administered medications for this visit           Health Maintenance     Health Maintenance   Topic Date Due    Hepatitis C Screening  Never done    HIV Screening  Never done    BMI: Followup Plan  Never done    Influenza Vaccine (1) 09/01/2021    Depression Screening  08/05/2022    Annual Physical  08/05/2022    BMI: Adult  09/22/2022    Cervical Cancer Screening  01/08/2023    DTaP,Tdap,and Td Vaccines (9 - Td or Tdap) 09/21/2031    HIB Vaccine  Completed    Hepatitis B Vaccine  Completed    IPV Vaccine  Completed    HPV Vaccine  Completed    COVID-19 Vaccine  Completed    Pneumococcal Vaccine: Pediatrics (0 to 5 Years) and At-Risk Patients (6 to 59 Years)  Aged Out    Hepatitis A Vaccine  Aged Out    Meningococcal ACWY Vaccine  Aged [employer] History   Administered Date(s) Administered    DTaP 5 1992, 01/28/1993, 05/01/1993, 03/22/1994, 08/21/1998    HPV Quadrivalent 10/19/2008, 03/03/2010, 07/02/2010    Hep B, adult 1992, 1992, 05/01/1993    Hib (PRP-OMP) 1992, 01/28/1993, 02/04/1994    IPV 1990, 1990, 01/27/1993, 03/22/1993, 08/21/1994    MMR 02/04/1994, 08/22/1997    Meningococcal, Unknown Serogroups 08/09/2007    SARS-CoV-2 / COVID-19 mRNA IM (Laura Manus) 05/07/2021, 06/02/2021    Td (adult), adsorbed 02/15/2005    Tdap 11/19/2008, 11/18/2019    Varicella 08/21/1998, 11/19/2008          Brendan Holliday DO

## 2021-10-13 ENCOUNTER — TELEPHONE (OUTPATIENT)
Dept: FAMILY MEDICINE CLINIC | Facility: CLINIC | Age: 31
End: 2021-10-13

## 2022-06-01 ENCOUNTER — TELEPHONE (OUTPATIENT)
Dept: UROLOGY | Facility: AMBULATORY SURGERY CENTER | Age: 32
End: 2022-06-01

## 2022-06-22 ENCOUNTER — OFFICE VISIT (OUTPATIENT)
Dept: UROLOGY | Facility: MEDICAL CENTER | Age: 32
End: 2022-06-22
Payer: COMMERCIAL

## 2022-06-22 VITALS
HEIGHT: 65 IN | DIASTOLIC BLOOD PRESSURE: 78 MMHG | HEART RATE: 80 BPM | OXYGEN SATURATION: 98 % | BODY MASS INDEX: 25.76 KG/M2 | WEIGHT: 154.6 LBS | SYSTOLIC BLOOD PRESSURE: 120 MMHG

## 2022-06-22 DIAGNOSIS — N20.0 NEPHROLITHIASIS: Primary | ICD-10-CM

## 2022-06-22 PROCEDURE — 99243 OFF/OP CNSLTJ NEW/EST LOW 30: CPT | Performed by: STUDENT IN AN ORGANIZED HEALTH CARE EDUCATION/TRAINING PROGRAM

## 2022-06-22 RX ORDER — NORETHINDRONE ACETATE AND ETHINYL ESTRADIOL 1; 20 MG/1; UG/1
1 TABLET ORAL DAILY
COMMUNITY
Start: 2022-05-09 | End: 2022-06-29

## 2022-06-22 NOTE — PROGRESS NOTES
UROLOGY OUTPATIENT CONSULT NOTE     Name: Mey Barker  : 1990  MRN: [id number]  Date of Service: 22      CHIEF COMPLAINT   Nephrolithiasis    History of Present Illness:     Mey Barker is a 36 y o  female presenting for evaluation of nephrolithiasis  The patient has a history of kidney stones and was previously managed by Dr Doreen Perea last seen in   The patient has a history of calcium oxalate stones and passed a 4 mm stone in   She has never required surgery for stones  Reportedly she had remaining bilateral stones but has not had any issues since   She is applying for [employer] and needs clearance from a Urology perspective  She has no current complaints  Specifically she denies fevers, chills, flank pain, or any urinary symptoms  PAST MEDICAL HISTORY:     Past Medical History:   Diagnosis Date    Kidney stones        PAST SURGICAL HISTORY:   History reviewed  No pertinent surgical history  CURRENT MEDICATIONS:     Current Outpatient Medications   Medication Sig Dispense Refill    norethindrone-ethinyl estradiol-ferrous fumarate (Junel Fe 24) 1-20 MG-MCG(24) per tablet Take by mouth      Junel 1/20 1-20 MG-MCG per tablet Take 1 tablet by mouth daily (Patient not taking: Reported on 2022)       No current facility-administered medications for this visit         ALLERGIES:   No Known Allergies    SOCIAL HISTORY:     Social History     Socioeconomic History    Marital status: Single     Spouse name: None    Number of children: None    Years of education: None    Highest education level: None   Occupational History    None   Tobacco Use    Smoking status: Never Smoker    Smokeless tobacco: Never Used   Substance and Sexual Activity    Alcohol use: Never    Drug use: Never    Sexual activity: Not Currently   Other Topics Concern    None   Social History Narrative    None     Social Determinants of Health     Financial Resource Strain: Not on file   Food Insecurity: Not on file   Transportation Needs: Not on file   Physical Activity: Not on file   Stress: Not on file   Social Connections: Not on file   Intimate Partner Violence: Not on file   Housing Stability: Not on file       FAMILY HISTORY:     Family History   Problem Relation Age of Onset    No Known Problems Mother     Arthritis Father     Skin cancer Father     Colon cancer Maternal Grandmother     Arthritis Maternal Grandfather     Diabetes Maternal Grandfather     Dementia Paternal Grandmother        REVIEW OF SYSTEMS:     Review of Systems   Constitutional: Negative for chills, fever and unexpected weight change  HENT: Negative for hearing loss and sore throat  Eyes: Negative for redness and visual disturbance  Respiratory: Negative for cough and shortness of breath  Cardiovascular: Negative for chest pain, palpitations and leg swelling  Gastrointestinal: Negative for blood in stool, constipation, diarrhea, nausea and vomiting  Endocrine: Negative for cold intolerance and heat intolerance  Genitourinary:        Per HPI   Musculoskeletal: Negative for arthralgias, back pain and myalgias  Skin: Negative for color change and rash  Neurological: Negative for dizziness, seizures and headaches  Hematological: Does not bruise/bleed easily  PHYSICAL EXAM:     /78   Pulse 80   Ht 5' 5" (1 651 m)   Wt 70 1 kg (154 lb 9 6 oz)   SpO2 98%   BMI 25 73 kg/m²     General:  Healthy appearing female in no acute distress  Head:  Normocephalic, atraumatic  Eyes: no scleral icterus  ENMT: Nares patent, moist mucous membranes  Cardiovascular:  Regular rate  Respiratory:  Patient has unlabored respirations  Abdomen:  Abdomen nondistended  Musculoskeletal: Normal gait  Neurological: Grossly intact  Psych: Normal affect    ASSESSMENT:     36 y o  female with history of nephrolithiasis  Her current stone burden is unknown at this time as she has not had any recent imaging  The gold standard for evaluation of kidney stones is a noncontrast CT scan  We will obtain this and a urinalysis  Since she is menstruating will defer urinalysis for when she is off her cycle  An order has been placed and she was provided with a specimen cup      PLAN:     Noncontrast CT abdomen and pelvis to evaluate current stone burden  Urinalysis when not menstruating  Return to clinic to discuss results    Valorie Otero MD  [facility]

## 2022-06-29 ENCOUNTER — OFFICE VISIT (OUTPATIENT)
Dept: FAMILY MEDICINE CLINIC | Facility: CLINIC | Age: 32
End: 2022-06-29
Payer: COMMERCIAL

## 2022-06-29 VITALS
HEIGHT: 65 IN | WEIGHT: 154 LBS | TEMPERATURE: 98.2 F | SYSTOLIC BLOOD PRESSURE: 120 MMHG | OXYGEN SATURATION: 98 % | HEART RATE: 78 BPM | DIASTOLIC BLOOD PRESSURE: 80 MMHG | BODY MASS INDEX: 25.66 KG/M2

## 2022-06-29 DIAGNOSIS — N20.0 KIDNEY STONES: Primary | ICD-10-CM

## 2022-06-29 PROCEDURE — 3008F BODY MASS INDEX DOCD: CPT | Performed by: FAMILY MEDICINE

## 2022-06-29 PROCEDURE — 1036F TOBACCO NON-USER: CPT | Performed by: FAMILY MEDICINE

## 2022-06-29 PROCEDURE — 99214 OFFICE O/P EST MOD 30 MIN: CPT | Performed by: FAMILY MEDICINE

## 2022-06-29 PROCEDURE — 3725F SCREEN DEPRESSION PERFORMED: CPT | Performed by: FAMILY MEDICINE

## 2022-06-29 NOTE — PROGRESS NOTES
FAMILY PRACTICE OFFICE VISIT    NAME: Rafael Velazquez    AGE: 36 y o  SEX: female  : 1990   MRN: [id number]    DATE: 2022  TIME: 3:46 PM    Assessment and Plan   1  Kidney stones  Pt f/u with uro  And has CT scheduled      Eye exam within the last year  Wears glasses - when driving and at work    PHQ-9 score of ZERO  TREMAYNE-7 score of ZERO    Prepared letter for patient - stating that she does not currently have any acute mental health concerns  Her mental health status is stable without meds  Agree with counseling  Pt is entering the [employer]    Pt needs to get repeat fasting labs in f/u to HL and elevated protein  Reminder to get done  Pt will be starting fish oil   And is working out a couple times a week - cardioboxing        Chief Complaint     Chief Complaint   Patient presents with    Follow-up     Need letter for [employer]        History of Present Illness   Dairl Men is a 36y o -year-old female who presents today for  clearance    Pt currently under care of urology for h/o remote kidney stones  Also sees gyn regularly   No h/o abnormal paps    Will be taking a test for   Pt took Tongan   But will be learning whatever language she will be needed for  Review of Systems   Review of Systems   Constitutional: Negative for chills, diaphoresis, fatigue, fever and unexpected weight change  Eyes:        Wears glasses     Respiratory: Negative for cough, shortness of breath and wheezing  Cardiovascular: Negative for chest pain, palpitations and leg swelling  Gastrointestinal: Negative for abdominal pain, blood in stool, diarrhea, nausea and vomiting  Genitourinary: Negative for dysuria, flank pain, frequency, hematuria and urgency  Taking ocp    Kidney stone X 1 in   Has not passed anything since       Musculoskeletal: Negative for arthralgias, back pain, joint swelling, myalgias and neck pain     Neurological: Negative for dizziness, seizures, syncope and headaches  Hematological: Negative for adenopathy  Does not bruise/bleed easily  No h/o DVT or pE     Psychiatric/Behavioral: Negative for agitation, confusion, decreased concentration, dysphoric mood, hallucinations, self-injury, sleep disturbance and suicidal ideas  The patient is not nervous/anxious  Lives alone with 2 cats  No h/o hospitalizations for mental health issues  No h/o suicide attempts  No h/o taking meds for depression or anxiety in the past  Sees a counselor - Lety Crandall- once weekly - in person - began seeing her in high school but then on and off thru the years when needed  Most recently - pt began with counseling again in 9/2021       Active Problem List     Patient Active Problem List   Diagnosis    Kidney stones         Past Medical History:  Past Medical History:   Diagnosis Date    Kidney stones        Past Surgical History:  History reviewed  No pertinent surgical history      Family History:  Family History   Problem Relation Age of Onset    No Known Problems Mother     Arthritis Father     Skin cancer Father     Colon cancer Maternal Grandmother     Arthritis Maternal Grandfather     Diabetes Maternal Grandfather     Dementia Paternal Grandmother        Social History:  Social History     Socioeconomic History    Marital status: Single     Spouse name: Not on file    Number of children: Not on file    Years of education: Not on file    Highest education level: Not on file   Occupational History    Not on file   Tobacco Use    Smoking status: Never Smoker    Smokeless tobacco: Never Used   Substance and Sexual Activity    Alcohol use: Never    Drug use: Never    Sexual activity: Not Currently   Other Topics Concern    Not on file   Social History Narrative    Not on file     Social Determinants of Health     Financial Resource Strain: Not on file   Food Insecurity: Not on file   Transportation Needs: Not on file   Physical Activity: Not on file   Stress: Not on file   Social Connections: Not on file   Intimate Partner Violence: Not on file   Housing Stability: Not on file       Objective     Vitals:    06/29/22 1532   BP: 120/80   Pulse: 78   Temp: 98 2 °F (36 8 °C)   SpO2: 98%     Wt Readings from Last 3 Encounters:   06/29/22 69 9 kg (154 lb)   06/22/22 70 1 kg (154 lb 9 6 oz)   09/22/21 73 5 kg (162 lb)       Physical Exam  Vitals and nursing note reviewed  Constitutional:       General: She is not in acute distress  Appearance: Normal appearance  She is not ill-appearing or toxic-appearing  Neck:      Vascular: No carotid bruit  Cardiovascular:      Rate and Rhythm: Normal rate and regular rhythm  Pulses: Normal pulses  Heart sounds: Normal heart sounds  No murmur heard  Pulmonary:      Effort: Pulmonary effort is normal  No respiratory distress  Breath sounds: Normal breath sounds  No wheezing, rhonchi or rales  Musculoskeletal:      Cervical back: Neck supple  Lymphadenopathy:      Cervical: No cervical adenopathy  Neurological:      Mental Status: She is alert and oriented to person, place, and time  Psychiatric:         Mood and Affect: Mood normal          Behavior: Behavior normal          Thought Content:  Thought content normal          Judgment: Judgment normal       Comments: Very pleasant           Pertinent Laboratory/Diagnostic Studies:  Lab Results   Component Value Date    BUN 11 09/13/2021    CREATININE 0 84 09/13/2021    CALCIUM 9 0 09/13/2021    K 3 6 09/13/2021    CO2 25 09/13/2021     09/13/2021     Lab Results   Component Value Date    ALT 17 09/13/2021    AST 11 09/13/2021    ALKPHOS 85 09/13/2021       Lab Results   Component Value Date    WBC 8 70 09/13/2021    HGB 14 1 09/13/2021    HCT 42 7 09/13/2021    MCV 89 09/13/2021     09/13/2021       No results found for: TSH    No results found for: CHOL  Lab Results   Component Value Date    TRIG 61 09/13/2021     Lab Results Component Value Date    HDL 62 09/13/2021     Lab Results   Component Value Date    LDLCALC 165 (H) 09/13/2021     No results found for: HGBA1C    Results for orders placed or performed in visit on 09/13/21   CBC and differential   Result Value Ref Range    WBC 8 70 4 31 - 10 16 Thousand/uL    RBC 4 82 3 81 - 5 12 Million/uL    Hemoglobin 14 1 11 5 - 15 4 g/dL    Hematocrit 42 7 34 8 - 46 1 %    MCV 89 82 - 98 fL    MCH 29 3 26 8 - 34 3 pg    MCHC 33 0 31 4 - 37 4 g/dL    RDW 12 3 11 6 - 15 1 %    MPV 11 2 8 9 - 12 7 fL    Platelets 048 997 - 964 Thousands/uL    nRBC 0 /100 WBCs    Neutrophils Relative 67 43 - 75 %    Immat GRANS % 0 0 - 2 %    Lymphocytes Relative 25 14 - 44 %    Monocytes Relative 6 4 - 12 %    Eosinophils Relative 1 0 - 6 %    Basophils Relative 1 0 - 1 %    Neutrophils Absolute 5 93 1 85 - 7 62 Thousands/µL    Immature Grans Absolute 0 02 0 00 - 0 20 Thousand/uL    Lymphocytes Absolute 2 16 0 60 - 4 47 Thousands/µL    Monocytes Absolute 0 50 0 17 - 1 22 Thousand/µL    Eosinophils Absolute 0 05 0 00 - 0 61 Thousand/µL    Basophils Absolute 0 04 0 00 - 0 10 Thousands/µL   Comprehensive metabolic panel   Result Value Ref Range    Sodium 136 136 - 145 mmol/L    Potassium 3 6 3 5 - 5 3 mmol/L    Chloride 106 100 - 108 mmol/L    CO2 25 21 - 32 mmol/L    ANION GAP 5 4 - 13 mmol/L    BUN 11 5 - 25 mg/dL    Creatinine 0 84 0 60 - 1 30 mg/dL    Glucose, Fasting 79 65 - 99 mg/dL    Calcium 9 0 8 3 - 10 1 mg/dL    AST 11 5 - 45 U/L    ALT 17 12 - 78 U/L    Alkaline Phosphatase 85 46 - 116 U/L    Total Protein 8 7 (H) 6 4 - 8 2 g/dL    Albumin 4 1 3 5 - 5 0 g/dL    Total Bilirubin 0 49 0 20 - 1 00 mg/dL    eGFR 95 ml/min/1 73sq m   Lipid panel   Result Value Ref Range    Cholesterol 239 (H) 50 - 200 mg/dL    Triglycerides 61 <=150 mg/dL    HDL, Direct 62 >=40 mg/dL    LDL Calculated 165 (H) 0 - 100 mg/dL    Non-HDL-Chol (CHOL-HDL) 177 mg/dl   TSH, 3rd generation   Result Value Ref Range    TSH 3RD ARTEM 0 649 0 358 - 3 740 uIU/mL   Vitamin B12   Result Value Ref Range    Vitamin B-12 927 (H) 100 - 900 pg/mL       No orders of the defined types were placed in this encounter  ALLERGIES:  No Known Allergies    Current Medications     Current Outpatient Medications   Medication Sig Dispense Refill    norethindrone-ethinyl estradiol-ferrous fumarate (Junel Fe 24) 1-20 MG-MCG(24) per tablet Take by mouth       No current facility-administered medications for this visit           Health Maintenance     Health Maintenance   Topic Date Due    Hepatitis C Screening  Never done    HIV Screening  Never done    BMI: Followup Plan  Never done    COVID-19 Vaccine (3 - Booster for Moderna series) 11/02/2021    Annual Physical  08/05/2022    Influenza Vaccine (Season Ended) 09/01/2022    Cervical Cancer Screening  01/08/2023    BMI: Adult  06/22/2023    Depression Screening  06/29/2023    DTaP,Tdap,and Td Vaccines (9 - Td or Tdap) 09/21/2031    HIB Vaccine  Completed    Hepatitis B Vaccine  Completed    IPV Vaccine  Completed    HPV Vaccine  Completed    Pneumococcal Vaccine: Pediatrics (0 to 5 Years) and At-Risk Patients (6 to 59 Years)  Aged Out    Hepatitis A Vaccine  Aged Out    Meningococcal ACWY Vaccine  Aged [employer] History   Administered Date(s) Administered    COVID-19 MODERNA VACC 0 5 ML IM 05/07/2021, 06/02/2021    DTaP 5 1992, 01/28/1993, 05/01/1993, 03/22/1994, 08/21/1998    HPV Quadrivalent 10/19/2008, 03/03/2010, 07/02/2010    Hep B, adult 1992, 1992, 05/01/1993    Hib (PRP-OMP) 1992, 01/28/1993, 02/04/1994    IPV 1992, 1992, 01/27/1993, 03/22/1993, 08/21/1994    MMR 02/04/1994, 08/22/1997    Meningococcal, Unknown Serogroups 08/09/2007    Td (adult), adsorbed 02/15/2005    Tdap 11/19/2008, 11/18/2019, 09/21/2021    Varicella 08/21/1998, 11/19/2008          Stephenie Garcia DO

## 2022-06-29 NOTE — LETTER
To:  Whom It May Concern    Please note that Rafael Velazquez  - 1990 - is under my care for the past year  I am her primary care physician  We had a routine visit today, 2022  There are no acute concerns  Isaias Valdovinos is not currently (nor has she in the past) taking any medications for mental health  No history of hospitalizations for mental health  She is seeing a counselor weekly or less often  I do not feel that Isaias Valdovinos has any underlying mental health issues that need to be addressed with medication at present time, and that her mental health is very stable          Sincerely,        Dr Linette Washington

## 2022-07-05 ENCOUNTER — HOSPITAL ENCOUNTER (OUTPATIENT)
Dept: CT IMAGING | Facility: HOSPITAL | Age: 32
Discharge: HOME | End: 2022-07-05
Attending: STUDENT IN AN ORGANIZED HEALTH CARE EDUCATION/TRAINING PROGRAM
Payer: COMMERCIAL

## 2022-07-05 DIAGNOSIS — N20.0 NEPHROLITHIASIS: ICD-10-CM

## 2022-07-05 PROCEDURE — 74176 CT ABD & PELVIS W/O CONTRAST: CPT

## 2022-07-06 ENCOUNTER — APPOINTMENT (OUTPATIENT)
Dept: LAB | Facility: MEDICAL CENTER | Age: 32
End: 2022-07-06
Payer: COMMERCIAL

## 2022-07-06 DIAGNOSIS — N20.0 NEPHROLITHIASIS: ICD-10-CM

## 2022-07-06 LAB
BACTERIA UR QL AUTO: ABNORMAL /HPF
BILIRUB UR QL STRIP: NEGATIVE
CAOX CRY URNS QL MICRO: ABNORMAL /HPF
CLARITY UR: ABNORMAL
COLOR UR: ABNORMAL
GLUCOSE UR STRIP-MCNC: NEGATIVE MG/DL
HGB UR QL STRIP.AUTO: NEGATIVE
KETONES UR STRIP-MCNC: ABNORMAL MG/DL
LEUKOCYTE ESTERASE UR QL STRIP: ABNORMAL
MUCOUS THREADS UR QL AUTO: ABNORMAL
NITRITE UR QL STRIP: NEGATIVE
NON-SQ EPI CELLS URNS QL MICRO: ABNORMAL /HPF
PH UR STRIP.AUTO: 6 [PH]
PROT UR STRIP-MCNC: ABNORMAL MG/DL
RBC #/AREA URNS AUTO: ABNORMAL /HPF
SP GR UR STRIP.AUTO: 1.03 (ref 1–1.03)
UROBILINOGEN UR STRIP-ACNC: <2 MG/DL
WBC #/AREA URNS AUTO: ABNORMAL /HPF

## 2022-07-06 PROCEDURE — 81001 URINALYSIS AUTO W/SCOPE: CPT

## 2022-07-19 ENCOUNTER — OFFICE VISIT (OUTPATIENT)
Dept: UROLOGY | Facility: MEDICAL CENTER | Age: 32
End: 2022-07-19
Payer: COMMERCIAL

## 2022-07-19 VITALS
SYSTOLIC BLOOD PRESSURE: 122 MMHG | WEIGHT: 156.4 LBS | HEART RATE: 77 BPM | DIASTOLIC BLOOD PRESSURE: 72 MMHG | HEIGHT: 65 IN | BODY MASS INDEX: 26.06 KG/M2

## 2022-07-19 DIAGNOSIS — N20.0 NEPHROLITHIASIS: Primary | ICD-10-CM

## 2022-07-19 PROCEDURE — 99213 OFFICE O/P EST LOW 20 MIN: CPT | Performed by: STUDENT IN AN ORGANIZED HEALTH CARE EDUCATION/TRAINING PROGRAM

## 2022-07-19 PROCEDURE — 87086 URINE CULTURE/COLONY COUNT: CPT | Performed by: STUDENT IN AN ORGANIZED HEALTH CARE EDUCATION/TRAINING PROGRAM

## 2022-07-19 PROCEDURE — 81001 URINALYSIS AUTO W/SCOPE: CPT | Performed by: STUDENT IN AN ORGANIZED HEALTH CARE EDUCATION/TRAINING PROGRAM

## 2022-07-19 NOTE — LETTER
To Whom it May Concern,    Carlotta Stahl has been evaluated by myself in the Urology office and has been cleared for duties in the [employer] from a urologic perspective  There is a very small residual kidney stone on the right inside the kidney that is not causing obstruction  I recommend surveillance for this stone especially since it is small enough to pass  Her urinalysis is not concerning for any adverse pathology and represents some vaginal/skin isaak within the urine  Please see attached results/notes detailing my assessment and plan  If you have any questions please reach out to our office at [phone]      Sincerely,    Va Christianson MD

## 2022-07-20 LAB
BACTERIA UR QL AUTO: ABNORMAL /HPF
BILIRUB UR QL STRIP: NEGATIVE
CAOX CRY URNS QL MICRO: ABNORMAL /HPF
CLARITY UR: CLEAR
COLOR UR: ABNORMAL
GLUCOSE UR STRIP-MCNC: NEGATIVE MG/DL
HGB UR QL STRIP.AUTO: NEGATIVE
KETONES UR STRIP-MCNC: NEGATIVE MG/DL
LEUKOCYTE ESTERASE UR QL STRIP: ABNORMAL
MUCOUS THREADS UR QL AUTO: ABNORMAL
NITRITE UR QL STRIP: NEGATIVE
NON-SQ EPI CELLS URNS QL MICRO: ABNORMAL /HPF
PH UR STRIP.AUTO: 6 [PH]
PROT UR STRIP-MCNC: ABNORMAL MG/DL
RBC #/AREA URNS AUTO: ABNORMAL /HPF
SP GR UR STRIP.AUTO: 1.02 (ref 1–1.03)
UROBILINOGEN UR STRIP-ACNC: <2 MG/DL
WBC #/AREA URNS AUTO: ABNORMAL /HPF

## 2022-07-22 LAB — BACTERIA UR CULT: NORMAL

## 2022-07-22 NOTE — PROGRESS NOTES
7/19/2022    Ellynalida Galenws  1990  [id number]      Chief Complaint: Follow-up for nephrolithiasis    History of Present Illness  Jimmy Pearce is a 36 y o  female who initially presented to me for medical clearance for the [employer]  She has a history of calcium oxalate stones in the past   She was told that she had residual stones in she need to clarify her current stone burden and if these need to be treated  I got a CT abdomen and pelvis which demonstrates a single 3 mm right renal stone  I independently reviewed the images  She presents today in follow-up  She still denies any symptoms whatsoever  Her urinalysis was contaminated but did not show blood  Review of Systems  Review of Systems   Constitutional: Negative for chills, fever and unexpected weight change  HENT: Negative for hearing loss and sore throat  Eyes: Negative for redness and visual disturbance  Respiratory: Negative for cough and shortness of breath  Cardiovascular: Negative for chest pain, palpitations and leg swelling  Gastrointestinal: Negative for blood in stool, constipation, diarrhea, nausea and vomiting  Endocrine: Negative for cold intolerance and heat intolerance  Genitourinary:        Per HPI   Musculoskeletal: Negative for arthralgias, back pain and myalgias  Skin: Negative for color change and rash  Neurological: Negative for dizziness, seizures and headaches  Hematological: Does not bruise/bleed easily  Past Medical History  Past Medical History:   Diagnosis Date    Kidney stones        Past Surgical History  History reviewed  No pertinent surgical history  Physical Exam  /72 (BP Location: Left arm, Patient Position: Sitting, Cuff Size: Adult)   Pulse 77   Ht 5' 5" (1 651 m)   Wt 70 9 kg (156 lb 6 4 oz)   BMI 26 03 kg/m²     General:  Healthy appearing female in no acute distress  Head:  Normocephalic, atraumatic     ENMT: Nares patent, moist mucous membranes  Cardiovascular: Regular rate  Respiratory:  Patient has unlabored respirations  Abdomen:  Abdomen nondistended  Musculoskeletal: Normal gait  Neurological: Grossly intact  Psych: Normal affect    Results  I have personally reviewed all pertinent lab results and reviewed with patient  CT ABDOMEN AND PELVIS WITHOUT IV CONTRAST     INDICATION:   N20 0: Calculus of kidney      COMPARISON:  None      TECHNIQUE:  CT examination of the abdomen and pelvis was performed without intravenous contrast  This examination was performed without intravenous contrast in the context of the critical nationwide Omnipaque shortage  Axial, sagittal, and coronal 2D   reformatted images were created from the source data and submitted for interpretation       Radiation dose length product (DLP) for this visit:  307 mGy-cm   This examination, like all CT scans performed in the [facility], was performed utilizing techniques to minimize radiation dose exposure, including the use of iterative   reconstruction and automated exposure control       Enteric contrast was not administered       FINDINGS:     ABDOMEN     LOWER CHEST:  No clinically significant abnormality identified in the visualized lower chest      LIVER/BILIARY TREE:  Unremarkable      GALLBLADDER:  No calcified gallstones  No pericholecystic inflammatory change      SPLEEN:  Unremarkable      PANCREAS:  Unremarkable      ADRENAL GLANDS:  Unremarkable      KIDNEYS/URETERS:  3 mm right renal nonobstructing calculus  Otherwise unremarkable  No perinephric collection      STOMACH AND BOWEL:  Unremarkable      APPENDIX:  No findings to suggest appendicitis      ABDOMINOPELVIC CAVITY:  No ascites  No pneumoperitoneum    No lymphadenopathy      VESSELS:  Unremarkable for patient's age      PELVIS     REPRODUCTIVE ORGANS:  Unremarkable for patient's age      URINARY BLADDER:  Unremarkable      ABDOMINAL WALL/INGUINAL REGIONS:  Unremarkable      OSSEOUS STRUCTURES:  No acute fracture or destructive osseous lesion      IMPRESSION:     3 mm right renal nonobstructing calculus          Assessment  72-year-old female with history of nephrolithiasis with only a single 3 mm right renal stone  The appropriate management for this stone is observation  The only indication to treat a stone this size in the kidney is for patient who is a   The patient states her job responsibilities do not require her to [address]  I recommend no further intervention  Her urinalysis was contaminated so we will repeat that  Plan  1  Repeat urinalysis and also obtain urine culture  2  Return to clinic p r n   3  Once results return will send a clearance letter by e-mail    Emre Zabala MD  [facility]    This note was written using fluency dictation software  Please excuse any resulting minor grammatical errors

## 2022-07-27 ENCOUNTER — TELEPHONE (OUTPATIENT)
Dept: OTHER | Facility: OTHER | Age: 32
End: 2022-07-27

## 2022-07-27 NOTE — TELEPHONE ENCOUNTER
Patient was seen by Dr Edel Escudero on 7/19 and wanted to follow up regarding medical clearance since patient is in the [employer]  Patient would like a call back to see when it will be completed

## 2022-07-28 NOTE — TELEPHONE ENCOUNTER
Patient did not have surgery  Medical clearance was from a CT scan, please see patient message back and forth from patient

## 2022-07-28 NOTE — TELEPHONE ENCOUNTER
Patient called saying that she is returning a phone that she had got from the office regarding her medical clearance letter for the [employer] and is asking if the office can give her back a call regarding this matter, patient did stated that Dr Alecia Pereira did told her that she would write her a clearance letter and a eval for the [employer]

## 2022-08-04 ENCOUNTER — TELEPHONE (OUTPATIENT)
Dept: UROLOGY | Facility: MEDICAL CENTER | Age: 32
End: 2022-08-04

## 2022-12-02 ENCOUNTER — TELEPHONE (OUTPATIENT)
Dept: UROLOGY | Facility: MEDICAL CENTER | Age: 32
End: 2022-12-02

## 2022-12-02 DIAGNOSIS — N20.0 NEPHROLITHIASIS: Primary | ICD-10-CM

## 2022-12-03 NOTE — TELEPHONE ENCOUNTER
----- Message from Majo Holguin RN sent at 11/25/2022  8:12 AM EST -----  Regarding: FW: Kidney Stone Removal  Contact: [phone]    ----- Message -----  From: Neftali Mathew  Sent: 11/23/2022   8:57 PM EST  To: [facility]  Subject: Lorrayne [place] Removal                             Antelmo Garcia,    I am waiting for word back from the [facility] on whether or not they will accept me with my kidney stone  My  thinks they will need me to get it removed if I want to join  Do you have a price estimate with and without insurance for how much a kidney stone removal would cost?  Would I need another test to verify it is still there? What is the process to get this kind of procedure done?     Thank you,  Rafael Velazquez

## 2022-12-05 NOTE — TELEPHONE ENCOUNTER
Dr Arnulfo Garduno: Thank you  Dr Milagros Claude will be out of the office for the next 2 weeks  I will order CT stone study

## 2022-12-07 NOTE — TELEPHONE ENCOUNTER
Pt sent in [employer] message asking for clarification of CT scan orders  I tried to call the patient to review the recommendations  She did not answer  LMOM asking her to contact the office to review recommendations and plan for CT scan moving forward  Office number was provided for the patient to call back with any questions or concerns

## 2023-04-23 ENCOUNTER — PATIENT MESSAGE (OUTPATIENT)
Dept: UROLOGY | Facility: MEDICAL CENTER | Age: 33
End: 2023-04-23

## 2023-04-24 ENCOUNTER — TELEPHONE (OUTPATIENT)
Dept: UROLOGY | Facility: AMBULATORY SURGERY CENTER | Age: 33
End: 2023-04-24

## 2023-04-24 NOTE — TELEPHONE ENCOUNTER
"----- Message from Flor Powers RN sent at 4/24/2023  9:35 AM EDT -----  Regarding: FW: Kidney Stone CT Scan Update  Contact: [phone]    ----- Message -----  From: Armin Caruso  Sent: 4/23/2023  11:55 AM EDT  To: [facility]  Subject: Kidney Stone CT Scan Update                      Hello,     [employer]  would like me to get another CT scan to check the status of my kidney stone, since it has been a while since my last check  Is the CT in my MyChart,  \"CT RENAL STONE STUDY ABDOMEN PELVIS WO CONTRAST\"  a regular check to see the status of my kidney stones?  If not, can I get a standard overall check, before we discuss removal?    Thank you,  Rafael Velazquez    "

## 2023-05-06 ENCOUNTER — HOSPITAL ENCOUNTER (OUTPATIENT)
Dept: CT IMAGING | Facility: HOSPITAL | Age: 33
Discharge: HOME | End: 2023-05-06

## 2023-05-06 DIAGNOSIS — N20.0 NEPHROLITHIASIS: ICD-10-CM

## 2023-05-09 ENCOUNTER — TELEPHONE (OUTPATIENT)
Dept: UROLOGY | Facility: MEDICAL CENTER | Age: 33
End: 2023-05-09

## 2023-05-09 NOTE — TELEPHONE ENCOUNTER
----- Message from Sandro Dodge  sent at 5/9/2023  8:35 AM EDT -----  No ureteral stone identified  Renal calculus noted but not obstructing so causing no pain/hydro  Pt is aware of results  She had seen results on My Chart

## 2023-05-30 NOTE — TELEPHONE ENCOUNTER
Message previously sent to Dr Khris Campbell to manage   Please re-direct to her on her return to the office

## 2023-06-16 ENCOUNTER — TELEPHONE (OUTPATIENT)
Dept: UROLOGY | Facility: MEDICAL CENTER | Age: 33
End: 2023-06-16

## 2023-06-16 NOTE — TELEPHONE ENCOUNTER
Regarding: Kidney Stone CT Scan Update  Contact: [phone]  ----- Message from Maryjane Long MD sent at 6/15/2023 10:55 PM EDT -----  Please compose a letter for the patient stating her CT demonstrates a punctate stone in the right kidney for which treatment is not required  She may proceed with her [employer] duties without restriction      ----- Message sent from Maryjane Long MD to Mary Reyez at 6/15/2023 10:53 PM -----   Db Hall,    I reviewed your CT and there is a very small stone within the kidney for which treatment is not required  I will have the office compose a note for aliyah Cintron,    Dr Marilynn Marcum       ----- Message -----       From:Rafael Velazquez       Sent:5/29/2023  5:40 PM EDT         Lanette Suárez List    Norma Marcum,     I recently got a CAT scan on my kidney stone  Could you please review the results, and write a letter saying that you think I am ok to join the [employer]? I need the same type of letter as last year, this time it is for the [employer]  Please let me know if you have any questions  Thank you,  -Rafael Velazquez      ----- Message -----       From:Rafael Velazquez       Sent:5/15/2023  9:05 PM EDT         To:Patient Medical Advice Request Message List    Norma Marcum,    I just got an updated CAT scan last week on my kidney stone  Could you please review the results, and write a letter saying that you think I am ok to join the [employer]? I need the same type of letter as last year, this time it is for the [employer]  Please let me know if you have any questions    Thank you so much!!    -Rafael Velazquez      ----- Message -----       From:Rafael Velazquez       Sent:4/23/2023 11:55 AM EDT         To:Janette Perez    Subject:Kidney Stone CT Scan Update    Hello,     [employer]  would like me to get another CT scan to check the status of my kidney stone, since it has been a "while since my last check  Is the CT in my MyChart,  \"CT RENAL STONE STUDY ABDOMEN PELVIS WO CONTRAST\"  a regular check to see the status of my kidney stones?  If not, can I get a standard overall check, before we discuss removal?    Thank you,  Rafael Velazquez  "